# Patient Record
Sex: FEMALE | Race: WHITE | Employment: UNEMPLOYED | URBAN - METROPOLITAN AREA
[De-identification: names, ages, dates, MRNs, and addresses within clinical notes are randomized per-mention and may not be internally consistent; named-entity substitution may affect disease eponyms.]

---

## 2024-05-14 ENCOUNTER — HOSPITAL ENCOUNTER (EMERGENCY)
Age: 39
Discharge: HOME | End: 2024-05-14
Attending: EMERGENCY MEDICINE

## 2024-05-14 VITALS
RESPIRATION RATE: 17 BRPM | WEIGHT: 132 LBS | HEART RATE: 64 BPM | OXYGEN SATURATION: 99 % | BODY MASS INDEX: 23.39 KG/M2 | SYSTOLIC BLOOD PRESSURE: 111 MMHG | HEIGHT: 63 IN | TEMPERATURE: 98 F | DIASTOLIC BLOOD PRESSURE: 64 MMHG

## 2024-05-14 DIAGNOSIS — H10.11 ALLERGIC CONJUNCTIVITIS OF RIGHT EYE: Primary | ICD-10-CM

## 2024-05-14 PROCEDURE — 99283 EMERGENCY DEPT VISIT LOW MDM: CPT

## 2024-05-14 PROCEDURE — 99284 EMERGENCY DEPT VISIT MOD MDM: CPT

## 2024-05-14 RX ORDER — PREDNISONE 20 MG/1
60 TABLET ORAL DAILY
Qty: 15 TABLET | Refills: 0 | Status: SHIPPED | OUTPATIENT
Start: 2024-05-14 | End: 2024-05-19

## 2024-05-14 RX ORDER — PREDNISONE 20 MG/1
60 TABLET ORAL ONCE
Status: COMPLETED | OUTPATIENT
Start: 2024-05-14 | End: 2024-05-14

## 2024-05-14 RX ORDER — BEPOTASTINE BESILATE 15 MG/ML
1 SOLUTION/ DROPS OPHTHALMIC EVERY 8 HOURS PRN
Qty: 1 EACH | Refills: 1 | Status: SHIPPED | OUTPATIENT
Start: 2024-05-14

## 2024-05-14 RX ORDER — DIPHENHYDRAMINE HCL 50 MG
50 CAPSULE ORAL ONCE
Status: COMPLETED | OUTPATIENT
Start: 2024-05-14 | End: 2024-05-14

## 2024-05-14 NOTE — ED PROVIDER NOTES
Patient Seen in: [facility] In [place]      History     Chief Complaint   Patient presents with    Allergic Rxn Allergies    Swelling Edema     Stated Complaint: right facial swelling [id number]    Subjective:   HPI    38-year-old female presenting with a possible allergic reaction.  Patient says she has some nasal congestion some sneezing she does have history of allergies.  She says that she is some warm compresses which seemed to help with the sensitivity and itching of her eyes and then woke up this evening with her right eye swollen with no discharge from the eye.  She has been also having some increased nasal congestion and sneezing prior to her visit here.  She denies any sort of cough cold fevers chills or any other exacerbating leaving factors    Objective:   Past Medical History:    Environmental and seasonal allergies              History reviewed. No pertinent surgical history.             Social History     Tobacco Use    Smoking status: Never    Smokeless tobacco: Never   Vaping Use    Vaping status: Never Used   Substance and Sexual Activity    Alcohol use: Never    Drug use: Never              Review of Systems    Positive for stated complaint: right facial swelling [id number]  Other systems are as noted in HPI.  Constitutional and vital signs reviewed.      All other systems reviewed and negative except as noted above.    Physical Exam     ED Triage Vitals [05/14/24 0300]   /64   Pulse 68   Resp 16   Temp 98.4 °F (36.9 °C)   Temp src Oral   SpO2 98 %   O2 Device None (Room air)       Current Vitals:   Vital Signs  BP: 148/64  Pulse: 68  Resp: 16  Temp: 98.4 °F (36.9 °C)  Temp src: Oral    Oxygen Therapy  SpO2: 98 %  O2 Device: None (Room air)            Physical Exam  Awake alert patient appears nondistressed patient is noted to have right periorbital edema but no erythema when able to open up the eyelids there is a corn and the pupils are 3 mm and reactive there is also some  blistering of the conjunctiva from the 3:00 to 12 o'clock position but does not cross the limbus.  Patient has no visual complaints were able to open up the eyelids.  There is no other rash on the patient's face oropharyngeal exam is normal lungs are clear no wheezes retractions rhonchi cardiovascular exam regular rhythm abdomen soft nontender extremities no Cyanosis or edema no other rash noted       ED Course   Labs Reviewed - No data to display          Differential diagnosis includes anaphylaxis angioedema allergic reaction         MDM                                         Medical Decision Making  38-year-old female presenting with right eye swelling clinically the patient has an allergic reaction patient will be prescribed steroids first dose given emerged department recommended antihistamines and patient will also be prescribed allergy eyedrops for allergic conjunctivitis.  Does not appear to be infected or cellulitic/cellulitic at this time is given acutely patient will be discharged home is to return emerged part worsening symptoms or other complaints  The patient was screened and evaluated during this visit.  As a treating physician attending to the patient, I determined, within reasonable clinical confidence and prior to discharge, that an emergency medical condition was not or was no longer present.  There was no indication for further evaluation, treatment or admission on an emergency basis.    The usual and customary discharge instructions were discussed given the patient's ER course.  We discussed signs and symptoms that should prompt the patient's immediate return to the emergency department.  Reasonable over-the-counter and prescription treatment options and physician follow-up plan was discussed.  Patient was discharged home in good condition  This note was prepared using Dragon [employer] voice recognition dictation software.  As a result errors may occur.  When identified to these areas have been  corrected.  While every attempt is made to correct errors during dictation discrepancies may still exist.  Please contact if there are any errors    Problems Addressed:  Allergic conjunctivitis of right eye: acute illness or injury    Amount and/or Complexity of Data Reviewed  ECG/medicine tests: ordered and independent interpretation performed. Decision-making details documented in ED Course.    Risk  Prescription drug management.        Disposition and Plan     Clinical Impression:  1. Allergic conjunctivitis of right eye         Disposition:  Discharge  5/14/2024  3:14 am    Follow-up:  No follow-up provider specified.        Medications Prescribed:  Current Discharge Medication List        START taking these medications    Details   predniSONE 20 MG Oral Tab Take 3 tablets (60 mg total) by mouth daily for 5 days.  Qty: 15 tablet, Refills: 0      Bepotastine Besilate (BEPREVE) 1.5 % Ophthalmic Solution Apply 1 drop to eye every 8 (eight) hours as needed.  Qty: 1 each, Refills: 1

## 2024-05-14 NOTE — ED INITIAL ASSESSMENT (HPI)
Pt to ED with right eye swelling, onset about 30 mins PTA, reports throat feels itchy. Denies new food/med. Has had sinus congestion the last few days. Voice is clear.